# Patient Record
Sex: FEMALE | Employment: FULL TIME | URBAN - METROPOLITAN AREA
[De-identification: names, ages, dates, MRNs, and addresses within clinical notes are randomized per-mention and may not be internally consistent; named-entity substitution may affect disease eponyms.]

---

## 2022-12-07 ENCOUNTER — APPOINTMENT (OUTPATIENT)
Dept: CT IMAGING | Facility: CLINIC | Age: 32
End: 2022-12-07
Attending: EMERGENCY MEDICINE
Payer: OTHER GOVERNMENT

## 2022-12-07 ENCOUNTER — HOSPITAL ENCOUNTER (EMERGENCY)
Facility: CLINIC | Age: 32
Discharge: HOME | End: 2022-12-07
Attending: EMERGENCY MEDICINE | Admitting: EMERGENCY MEDICINE
Payer: OTHER GOVERNMENT

## 2022-12-07 VITALS
DIASTOLIC BLOOD PRESSURE: 58 MMHG | RESPIRATION RATE: 20 BRPM | TEMPERATURE: 97.2 F | WEIGHT: 180 LBS | OXYGEN SATURATION: 99 % | HEART RATE: 84 BPM | SYSTOLIC BLOOD PRESSURE: 127 MMHG

## 2022-12-07 DIAGNOSIS — S06.0X0A CONCUSSION WITHOUT LOSS OF CONSCIOUSNESS, INITIAL ENCOUNTER: ICD-10-CM

## 2022-12-07 DIAGNOSIS — S16.1XXA STRAIN OF NECK MUSCLE, INITIAL ENCOUNTER: ICD-10-CM

## 2022-12-07 PROCEDURE — 72125 CT NECK SPINE W/O DYE: CPT

## 2022-12-07 PROCEDURE — 99284 EMERGENCY DEPT VISIT MOD MDM: CPT | Mod: 25

## 2022-12-07 PROCEDURE — 70450 CT HEAD/BRAIN W/O DYE: CPT

## 2022-12-07 ASSESSMENT — ENCOUNTER SYMPTOMS
HEADACHES: 1
PHOTOPHOBIA: 1
NUMBNESS: 0
VOMITING: 0
NECK PAIN: 1
ARTHRALGIAS: 1
DECREASED CONCENTRATION: 1
NAUSEA: 0
WEAKNESS: 0

## 2022-12-07 ASSESSMENT — ACTIVITIES OF DAILY LIVING (ADL): ADLS_ACUITY_SCORE: 33

## 2022-12-07 NOTE — Clinical Note
Janeen Bowie was seen and treated in our emergency department on 12/7/2022.  She may return to work on 12/12/2022.       If you have any questions or concerns, please don't hesitate to call.      Vaughn Cadena MD

## 2022-12-08 NOTE — ED PROVIDER NOTES
EMERGENCY DEPARTMENT ENCOUNTER      NAME: Janeen Bowie  AGE: 32 year old female  YOB: 1990  MRN: [id number]  EVALUATION DATE & TIME: 12/7/2022 10:30 PM    PCP: No Ref-Primary, Physician    ED PROVIDER: Vaughn Cadena M.D.      Chief Complaint   Patient presents with     Fall     Head Injury     Neck Injury         FINAL IMPRESSION:  1. Concussion without loss of consciousness, initial encounter    2. Strain of neck muscle, initial encounter          ED COURSE & MEDICAL DECISION MAKING:    Pertinent Labs & Imaging studies reviewed. (See chart for details)  32 year old female presents to the Emergency Department for evaluation of neck pain after a fall earlier today.  Imaging done from triage that shows normal head CT and normal CT of her neck.  Neck is cleared clinically.  Normal neurologic exam.  Likely she has a concussion and neck strain.  No signs of intracranial hemorrhage.  No signs of cervical fracture.  No other injuries.  Discussed supportive care.  We will follow-up with primary in a week.  Patient discharged will return for worsening symptoms.    10:50 PM I met with the patient to gather history and to perform my initial exam. I discussed the plan for care while in the Emergency Department. PPE: surgical mask.  11:00 PM Rechecked the patient. Discussed plan for discharge.    At the conclusion of the encounter I discussed the results of all of the tests and the disposition. The questions were answered. The patient or family acknowledged understanding and was agreeable with the care plan.       MEDICATIONS GIVEN IN THE EMERGENCY:  Medications - No data to display    NEW PRESCRIPTIONS STARTED AT TODAY'S ER VISIT  There are no discharge medications for this patient.         =================================================================    HPI    Patient information was obtained from: patient     Use of : N/A         Janeen Bowie is a 32 year old female with a pertinent  "history of anxiety who presents to this ED via walk-in for evaluation of a fall.    Patient presents with a headache and neck pain after a slip and fall on ice in a parking lot this morning. She fell onto her left elbow and left knee. States that her head got whiplashed though she does not think she hit it against the ground. She feels like she got \"rattled inside the base of her skull\". Since the fall, she has had a constant headache. This headache originated near her left temple but now radiates throughout her whole left side of her head. She reports associating photophobia and difficulty focusing. No diplopia, nausea, vomiting, numbness, or weakness. No history of migraines. States she has probably had concussions in the past but never got diagnosed.       REVIEW OF SYSTEMS   Review of Systems   Eyes: Positive for photophobia. Negative for visual disturbance.   Gastrointestinal: Negative for nausea and vomiting.   Musculoskeletal: Positive for arthralgias and neck pain.   Neurological: Positive for headaches. Negative for weakness and numbness.   Psychiatric/Behavioral: Positive for decreased concentration.   All other systems reviewed and are negative.       PAST MEDICAL HISTORY:  History reviewed. No pertinent past medical history.    PAST SURGICAL HISTORY:  History reviewed. No pertinent surgical history.        CURRENT MEDICATIONS:    No current facility-administered medications for this encounter.     No current outpatient medications on file.         ALLERGIES:  Allergies   Allergen Reactions     Cat Hair Extract Itching     CAT's & DUST MITES,  Congestion/sinuses & chest,   Increase mucus production Sneezing/coughing   General itching       Lactase Diarrhea     Other reaction(s): Stomach Upset     Pollen Extract      Other reaction(s): Runny Nose  TREES/SHRUBS/GRASSES     Sneezing, itchy watery eyes     Cephalosporins Rash     Other reaction(s): *Unknown - Childhood Rxn  VELOCEF....AS AN INFANT   "       FAMILY HISTORY:  History reviewed. No pertinent family history.    SOCIAL HISTORY:   Social History     Socioeconomic History     Marital status: Single       VITALS:  /58   Pulse 84   Temp 97.2  F (36.2  C)   Resp 20   Wt 81.6 kg (180 lb)   SpO2 99%     PHYSICAL EXAM    Physical Exam  Vitals and nursing note reviewed.   Constitutional:       General: She is not in acute distress.     Appearance: She is not diaphoretic.   HENT:      Head: Atraumatic.      Mouth/Throat:      Pharynx: No oropharyngeal exudate.   Eyes:      General: No scleral icterus.     Pupils: Pupils are equal, round, and reactive to light.   Cardiovascular:      Rate and Rhythm: Normal rate and regular rhythm.      Heart sounds: Normal heart sounds.   Pulmonary:      Effort: No respiratory distress.      Breath sounds: Normal breath sounds.   Abdominal:      Palpations: Abdomen is soft.      Tenderness: There is no abdominal tenderness. There is no guarding or rebound. Negative signs include Sanabria's sign.   Musculoskeletal:      Cervical back: Tenderness ( left lateral neck. No midline tenderness) present.   Skin:     General: Skin is warm.      Findings: No rash.   Neurological:      General: No focal deficit present.      Mental Status: She is alert.      Comments: 5 out of 5 strength in bilateral upper and lower extremities.  Sensation intact in all 4 extremes.  Cranial nerves intact.  No pronator drift             LAB:  All pertinent labs reviewed and interpreted.  Labs Ordered and Resulted from Time of ED Arrival to Time of ED Departure - No data to display    RADIOLOGY:  Reviewed all pertinent imaging. Please see official radiology report.  CT Cervical Spine w/o Contrast   Final Result   IMPRESSION:   HEAD CT:   1.  Normal head CT.      CERVICAL SPINE CT:   1.  No CT evidence for acute fracture or post traumatic subluxation.      CT Head w/o Contrast   Final Result   IMPRESSION:   HEAD CT:   1.  Normal head CT.       CERVICAL SPINE CT:   1.  No CT evidence for acute fracture or post traumatic subluxation.            I, Nikunj Lala, am serving as a scribe to document services personally performed by Dr. Vaughn Cadena, based on my observation and the provider's statements to me. I, Vaughn Cadena MD attest that Nikunj Lala is acting in a scribe capacity, has observed my performance of the services and has documented them in accordance with my direction.    Vaughn Cadena M.D.  Emergency Medicine  [facility] EMERGENCY ROOM  [address] [place] [id number]  [phone]  Dept: [phone]     Vaughn Cadena MD  12/07/22 1470

## 2022-12-08 NOTE — ED TRIAGE NOTES
Pt arrives from . Had a fall this AM from slipping on ice. Since has had increasing left side head pain and c spine tenderness. C-collar was placed in triage.      Triage Assessment     Row Name 12/07/22 2034       Triage Assessment (Adult)    Airway WDL WDL       Respiratory WDL    Respiratory WDL WDL       Skin Circulation/Temperature WDL    Skin Circulation/Temperature WDL WDL       Cardiac WDL    Cardiac WDL WDL       Peripheral/Neurovascular WDL    Peripheral Neurovascular WDL WDL       Cognitive/Neuro/Behavioral WDL    Cognitive/Neuro/Behavioral WDL WDL

## 2023-02-05 ENCOUNTER — HEALTH MAINTENANCE LETTER (OUTPATIENT)
Age: 33
End: 2023-02-05

## 2024-03-03 ENCOUNTER — HEALTH MAINTENANCE LETTER (OUTPATIENT)
Age: 34
End: 2024-03-03

## 2025-03-15 ENCOUNTER — HEALTH MAINTENANCE LETTER (OUTPATIENT)
Age: 35
End: 2025-03-15